# Patient Record
Sex: FEMALE | NOT HISPANIC OR LATINO | Employment: PART TIME
[De-identification: names, ages, dates, MRNs, and addresses within clinical notes are randomized per-mention and may not be internally consistent; named-entity substitution may affect disease eponyms.]

---

## 2017-06-17 ENCOUNTER — HEALTH MAINTENANCE LETTER (OUTPATIENT)
Age: 42
End: 2017-06-17

## 2022-10-11 NOTE — TELEPHONE ENCOUNTER
MEDICAL RECORDS REQUEST   [facility]  Urology Clinic  [address] [place] [address]  PHONE: [phone]  Fax: [phone]        FUTURE VISIT INFORMATION                                                   Meaghan Chavira, : 1975 scheduled for future visit at [facility]    APPOINTMENT INFORMATION:    Date: 10/26/2022    Provider:  Karl Hurt MD    Reason for Visit/Diagnosis: Urethral Diverticulum    RECORDS REQUESTED FOR VISIT                                                     NOTES  STATUS/DETAILS   OFFICE NOTE from referring provider  yes, 10/11/2022 -- [place]gu[place] APRN[place] [facility]ICATION LIST  yes, Care Everywhere     PRE-VISIT CHECKLIST      Record collection complete Yes   Appointment appropriately scheduled           (right time/right provider) Yes   Joint diagnostic appointment coordinated correctly          (ensure right order & amount of time) Yes   MyChart activation If no, please explain pending   Questionnaire complete If no, please explain pending

## 2022-10-14 ENCOUNTER — TRANSCRIBE ORDERS (OUTPATIENT)
Dept: OTHER | Age: 47
End: 2022-10-14

## 2022-10-14 DIAGNOSIS — N36.1 URETHRAL DIVERTICULUM: ICD-10-CM

## 2022-10-14 DIAGNOSIS — N39.46 MIXED INCONTINENCE: Primary | ICD-10-CM

## 2022-10-17 ENCOUNTER — PRE VISIT (OUTPATIENT)
Dept: UROLOGY | Facility: CLINIC | Age: 47
End: 2022-10-17

## 2022-10-26 ENCOUNTER — PRE VISIT (OUTPATIENT)
Dept: UROLOGY | Facility: CLINIC | Age: 47
End: 2022-10-26

## 2022-10-26 ENCOUNTER — VIRTUAL VISIT (OUTPATIENT)
Dept: UROLOGY | Facility: CLINIC | Age: 47
End: 2022-10-26
Payer: COMMERCIAL

## 2022-10-26 DIAGNOSIS — N36.1 URETHRAL DIVERTICULUM: ICD-10-CM

## 2022-10-26 DIAGNOSIS — N39.46 MIXED INCONTINENCE: ICD-10-CM

## 2022-10-26 PROCEDURE — 99207 PR NO CHARGE LOS: CPT | Performed by: UROLOGY

## 2022-10-26 RX ORDER — CYCLOBENZAPRINE HCL 5 MG
5-10 TABLET ORAL
COMMUNITY
Start: 2022-04-11

## 2022-10-26 RX ORDER — LANOLIN ALCOHOL/MO/W.PET/CERES
3 CREAM (GRAM) TOPICAL
COMMUNITY
Start: 2022-08-20

## 2022-10-26 RX ORDER — EPINEPHRINE 0.3 MG/.3ML
0.3 INJECTION SUBCUTANEOUS
COMMUNITY
Start: 2021-07-21

## 2022-10-26 RX ORDER — ALBUTEROL SULFATE 90 UG/1
2 AEROSOL, METERED RESPIRATORY (INHALATION)
COMMUNITY
Start: 2022-01-03

## 2022-10-26 RX ORDER — POLYETHYLENE GLYCOL 3350 17 G/17G
17 POWDER, FOR SOLUTION ORAL
COMMUNITY

## 2022-10-26 RX ORDER — DIPHENHYDRAMINE HCL 25 MG
25 CAPSULE ORAL
COMMUNITY

## 2022-10-26 RX ORDER — ONDANSETRON 4 MG/1
4 TABLET, ORALLY DISINTEGRATING ORAL
COMMUNITY
Start: 2022-04-09

## 2022-10-26 RX ORDER — PRAVASTATIN SODIUM 10 MG
10 TABLET ORAL DAILY
COMMUNITY

## 2022-10-26 RX ORDER — PREGABALIN 25 MG/1
25 CAPSULE ORAL
COMMUNITY
Start: 2022-04-11

## 2022-10-26 RX ORDER — INSULIN DEGLUDEC 200 U/ML
40 INJECTION, SOLUTION SUBCUTANEOUS
COMMUNITY
Start: 2022-06-29

## 2022-10-26 RX ORDER — IPRATROPIUM BROMIDE AND ALBUTEROL SULFATE 2.5; .5 MG/3ML; MG/3ML
3 SOLUTION RESPIRATORY (INHALATION)
COMMUNITY
Start: 2022-08-15

## 2022-10-26 RX ORDER — OMEPRAZOLE 40 MG/1
40 CAPSULE, DELAYED RELEASE ORAL
COMMUNITY
Start: 2021-12-02

## 2022-10-26 RX ORDER — CETIRIZINE HYDROCHLORIDE 10 MG/1
10 TABLET ORAL
COMMUNITY

## 2022-10-26 RX ORDER — ACETAMINOPHEN 500 MG
500-1000 TABLET ORAL
COMMUNITY
Start: 2022-08-10

## 2022-10-26 NOTE — LETTER
10/26/2022       RE: Meaghan Chavira  [address]  [facility] [address]     Dear Colleague,    Thank you for referring your patient, Meaghan Chavira, to the [facility] [place] at [facility]. Please see a copy of my visit note below.    Meaghan is a 47 year old who is being evaluated via a billable video visit.      How would you like to obtain your AVS? MyChart  If the video visit is dropped, the invitation should be resent by: Text to cell phone: [phone]  Will anyone else be joining your video visit? No        Video-Visit Details    Video Start Time: 8:41 AM    Type of service:  Video Visit    Video End Time:8:50 AM    Originating Location (pt. Location): Home        Distant Location (provider location):  Off-site    Platform used for Video Visit: Futubra    We were not able to connect through either AudioName or Sunlot.  Will help pt. Arrange for in person visit at [facility].      Again, thank you for allowing me to participate in the care of your patient.      Sincerely,    Karl Hurt MD

## 2022-10-26 NOTE — PROGRESS NOTES
We were not able to connect through either eTelemetry or CastleOS.  Will help pt. Arrange for in person visit at [facility].

## 2022-10-26 NOTE — PROGRESS NOTES
Meaghan is a 47 year old who is being evaluated via a billable video visit.      How would you like to obtain your AVS? MyChart  If the video visit is dropped, the invitation should be resent by: Text to cell phone: [phone]  Will anyone else be joining your video visit? No        Video-Visit Details    Video Start Time: 8:41 AM    Type of service:  Video Visit    Video End Time:8:50 AM    Originating Location (pt. Location): Home        Distant Location (provider location):  Off-site    [facility]

## 2022-10-26 NOTE — LETTER
Date:October 27, 2022      Provider requested that no letter be sent. Do not send.       [facility]

## 2022-12-04 ENCOUNTER — HEALTH MAINTENANCE LETTER (OUTPATIENT)
Age: 47
End: 2022-12-04

## 2022-12-14 ENCOUNTER — OFFICE VISIT (OUTPATIENT)
Dept: UROLOGY | Facility: CLINIC | Age: 47
End: 2022-12-14
Payer: COMMERCIAL

## 2022-12-14 VITALS
DIASTOLIC BLOOD PRESSURE: 88 MMHG | HEIGHT: 65 IN | SYSTOLIC BLOOD PRESSURE: 120 MMHG | WEIGHT: 150 LBS | OXYGEN SATURATION: 99 % | BODY MASS INDEX: 24.99 KG/M2 | HEART RATE: 93 BPM

## 2022-12-14 DIAGNOSIS — N36.1 URETHRAL DIVERTICULUM: ICD-10-CM

## 2022-12-14 DIAGNOSIS — N39.46 MIXED INCONTINENCE: Primary | ICD-10-CM

## 2022-12-14 DIAGNOSIS — M62.89 HIGH-TONE PELVIC FLOOR DYSFUNCTION: ICD-10-CM

## 2022-12-14 DIAGNOSIS — M79.18 MYALGIA OF PELVIC FLOOR: ICD-10-CM

## 2022-12-14 DIAGNOSIS — R14.0 BLOATING: ICD-10-CM

## 2022-12-14 DIAGNOSIS — F40.240 CLAUSTROPHOBIA: ICD-10-CM

## 2022-12-14 LAB
ALBUMIN UR-MCNC: NEGATIVE MG/DL
APPEARANCE UR: CLEAR
BILIRUB UR QL STRIP: NEGATIVE
COLOR UR AUTO: YELLOW
GLUCOSE UR STRIP-MCNC: 100 MG/DL
HGB UR QL STRIP: NEGATIVE
KETONES UR STRIP-MCNC: NEGATIVE MG/DL
LEUKOCYTE ESTERASE UR QL STRIP: NEGATIVE
NITRATE UR QL: NEGATIVE
PH UR STRIP: 6.5 [PH] (ref 5–7)
RESULT: 17
SP GR UR STRIP: 1.02 (ref 1–1.03)
UROBILINOGEN UR STRIP-ACNC: 0.2 E.U./DL

## 2022-12-14 PROCEDURE — 51798 US URINE CAPACITY MEASURE: CPT | Performed by: UROLOGY

## 2022-12-14 PROCEDURE — 99204 OFFICE O/P NEW MOD 45 MIN: CPT | Mod: 25 | Performed by: UROLOGY

## 2022-12-14 PROCEDURE — 81003 URINALYSIS AUTO W/O SCOPE: CPT | Mod: QW | Performed by: UROLOGY

## 2022-12-14 RX ORDER — ALPRAZOLAM 1 MG
1 TABLET ORAL ONCE
Qty: 1 TABLET | Refills: 0 | Status: SHIPPED | OUTPATIENT
Start: 2022-12-14 | End: 2022-12-14

## 2022-12-14 ASSESSMENT — ENCOUNTER SYMPTOMS
NAUSEA: 1
WEIGHT GAIN: 0
HALLUCINATIONS: 0
DEPRESSION: 0
HEMATURIA: 0
CHILLS: 0
STIFFNESS: 1
BACK PAIN: 0
JAUNDICE: 0
SKIN CHANGES: 0
NIGHT SWEATS: 1
MYALGIAS: 0
INCREASED ENERGY: 0
BLOATING: 1
POLYPHAGIA: 0
POOR WOUND HEALING: 0
BLOOD IN STOOL: 0
MUSCLE CRAMPS: 0
ALTERED TEMPERATURE REGULATION: 0
DIARRHEA: 0
MUSCLE WEAKNESS: 1
RECTAL PAIN: 0
NAIL CHANGES: 0
POLYDIPSIA: 0
JOINT SWELLING: 1
INSOMNIA: 0
NERVOUS/ANXIOUS: 1
FATIGUE: 0
DIFFICULTY URINATING: 1
FLANK PAIN: 1
DYSURIA: 0
HEARTBURN: 0
DECREASED APPETITE: 0
ARTHRALGIAS: 1
WEIGHT LOSS: 0
CONSTIPATION: 0
PANIC: 1
NECK PAIN: 1
FEVER: 0
DECREASED CONCENTRATION: 0
BOWEL INCONTINENCE: 0
ABDOMINAL PAIN: 1
VOMITING: 1

## 2022-12-14 ASSESSMENT — PAIN SCALES - GENERAL: PAINLEVEL: NO PAIN (0)

## 2022-12-14 NOTE — PATIENT INSTRUCTIONS
Please do the MRI and then return to discuss results    Websites with free information:    [employer] patient website: www.voicesforpfd.org    Total Control Program: www.totalcontrolprogram.com    It was a pleasure meeting with you today.  Thank you for allowing me and my team the privilege of caring for you today.  YOU are the reason we are here, and I truly hope we provided you with the excellent service you deserve.  Please let us know if there is anything else we can do for you so that we can be sure you are leaving completely satisfied with your care experience.

## 2022-12-14 NOTE — NURSING NOTE
Chief Complaint   Patient presents with     urethral diverticulum     Pt here today for Urethral Diverticulum.   pvr 17  DANNIELLE ZamarripaT

## 2022-12-14 NOTE — PROGRESS NOTES
December 14, 2022    Referring Provider: Kenna Argueta NP  [facility]  [address] [place] [address] [place] [address]    Primary Care Provider: Mirza Elliott    Assessment & Plan     Mixed incontinence  We discussed the etiologies of stress and urge incontinence and how they differ. We discussed that the options of treating stress incontinence to include observation, weight loss, pelvic floor physical therapy, incontinence pessary, urethral bulking agents, and surgical correction most commonly with midurethral sling or autologous rectus fascial sling. We then discussed that urge incontinence treatments include observation, weight loss, medications most commonly anticholinergics, physical therapy, biofeedback, intravesical botulinum toxin, percutaneous tibial nerve stimulation and sacral neuromodulation.     Will need to address the possible urethral diverticulum first    - MR Pelvis (GYN) wo & w Contrast; Future    Urethral diverticulum  MRI as it has been 5 years since last MRI, she requests a xanax for the MRI    - MEASURE POST-VOID RESIDUAL URINE/BLADDER CAPACITY, US NON-IMAGING (67343)  - UA without Microscopic [[id number]]; Future  - UA without Microscopic [[id number]]  - MR Pelvis (GYN) wo & w Contrast; Future  - ALPRAZolam (XANAX) 1 MG tablet; Take 1 tablet (1 mg) by mouth once for 1 dose Prior to MRI    High-tone pelvic floor dysfunction/Myalgia of pelvic floor  We discussed how her pelvic floor symptoms are related to the physical exam findings and her pelvic floor myofascial dysfunction.  We discussed how the recommended treatment is dedicated pelvic floor therapy.  Will wait to see what the MRI shows first.  Discussed that if we are to fix the diverticulum she would likely need postoperative pelvic floor therapy  - MR Pelvis (GYN) wo & w Contrast; Future  - ALPRAZolam (XANAX) 1 MG tablet; Take 1 tablet (1 mg) by mouth once for 1 dose Prior to MRI    Bloating  MRI will assess adnexa  - MR  Pelvis (GYN) wo & w Contrast; Future    Claustrophobia    - ALPRAZolam (XANAX) 1 MG tablet; Take 1 tablet (1 mg) by mouth once for 1 dose Prior to MRI             Nicotine/Tobacco Cessation:  She reports that she has been smoking cigarettes. She has been smoking an average of .5 packs per day. She has never used smokeless tobacco.    20 minutes were spent on this day of the encounter in reviewing the EMR including reviewing [facility] notes, [facility] CT scan result, direct patient care including ordering MRI and prescription medication, coordination of care and documentation    Delfina Allen MD MPH  (she/her/hers)   of Urology  [facility]      HPI:  Meaghan Chavira is a 47 year old female who presents for evaluation of her pelvic floor symptoms.     Feels lump in the vagina    Mixed incontinence-variable volume    Denies gross hematuria, UTI, vaginal bleeding, vaginal bulge.  On a stool softner for constipation.  History of dyspareunia and postcoital bleeding    Hysterectomy for fibroids, still ovaries in situ    She is referred to us from [facility], Ms Kenna Argueta (note in CareEverywhere from 10/11/2022).  She has had a history of a urethral diverticulum versus vaginal cyst noted first in 2017.  She recently had a CT scan 9/23/22 at [facility] with findings of a cystic structure at the bladder neck    Smoker-not ready to quit    Past Medical History:   Diagnosis Date     Thyroid disease        Past Surgical History:   Procedure Laterality Date     GYN SURGERY       Left elbow and dislocated shoulder       PARATHYROIDECTOMY N/A 8/28/2014    Procedure: PARATHYROIDECTOMY;  Surgeon: Wally Hamlin MD;  Location: [facility] OR   Cholecystectomy    Social History     Socioeconomic History     Marital status: Single     Spouse name: Not on file     Number of children: Not on file     Years of education: Not on file     Highest education level: Not on file   Occupational History     Not on  file   Tobacco Use     Smoking status: Every Day     Packs/day: 0.50     Types: Cigarettes     Smokeless tobacco: Never   Substance and Sexual Activity     Alcohol use: Yes     Comment: occasionally     Drug use: No     Sexual activity: Not on file   Other Topics Concern     Not on file   Social History Narrative     Not on file     Social Determinants of Health     Financial Resource Strain: Not on file   Food Insecurity: Not on file   Transportation Needs: Not on file   Physical Activity: Not on file   Stress: Not on file   Social Connections: Not on file   Intimate Partner Violence: Not on file   Housing Stability: Not on file     Family History   Problem Relation Age of Onset     Asthma Unknown         children     Review of Systems     Constitutional:  Positive for night sweats. Negative for fever, chills, weight loss, weight gain, fatigue, decreased appetite, recent stressors, height loss, post-operative complications, incisional pain, hallucinations, increased energy, hyperactivity and confused.   HENT:  Negative for ear pain, hearing loss, tinnitus, nosebleeds, trouble swallowing, hoarse voice, mouth sores, sore throat, ear discharge, tooth pain, gum tenderness, taste disturbance, smell disturbance, hearing aid, bleeding gums, dry mouth, sinus pain, sinus congestion and neck mass.    Eyes:  Negative for double vision, pain, redness, eye pain, decreased vision, eye watering, eye bulging, eye dryness, flashing lights, spots, floaters, strabismus, tunnel vision, jaundice and eye irritation.   Respiratory:   Negative for cough, hemoptysis, sputum production, shortness of breath, wheezing, sleep disturbances due to breathing, snores loudly, respiratory pain, dyspnea on exertion, cough disturbing sleep and postural dyspnea.    Cardiovascular:  Negative for chest pain, dyspnea on exertion, palpitations, orthopnea, claudication, leg swelling, fingers/toes turn blue, hypertension, hypotension, syncope, history of  heart murmur, chest pain on exertion, chest pain at rest, pacemaker, few scattered varicosities, leg pain, sleep disturbances due to breathing, tachycardia, light-headedness, exercise intolerance and edema.   Gastrointestinal:  Positive for nausea, vomiting, abdominal pain and bloating. Negative for heartburn, diarrhea, constipation, blood in stool, melena, rectal pain, bowel incontinence, jaundice, coffee ground emesis and change in stool.   Genitourinary:  Positive for bladder incontinence, flank pain, difficulty urinating and nocturia. Negative for dysuria, urgency, hematuria, vaginal discharge, genital sores, dyspareunia, decreased libido, voiding less frequently, arousal difficulty, abnormal vaginal bleeding, excessive menstruation, menstrual changes, hot flashes, vaginal dryness and postmenopausal bleeding.   Musculoskeletal:  Positive for joint swelling, arthralgias, stiffness, neck pain, bone pain and muscle weakness. Negative for myalgias, back pain, muscle cramps and fracture.   Skin:  Positive for sensitivity to sunlight. Negative for nail changes, itching, poor wound healing, rash, hair changes, skin changes, acne, warts, poor wound healing, scarring, flaky skin, Raynaud's phenomenon and skin thickening.   Neurological:  Negative for dizziness, tingling, tremors, speech change, seizures, loss of consciousness, weakness, light-headedness, numbness, headaches, disturbances in coordination, extremity numbness, memory loss, difficulty walking and paralysis.   Endo/Heme:  Negative for anemia, swollen glands and bruises/bleeds easily.   Psychiatric/Behavioral:  Positive for mood swings and panic attacks. Negative for depression, hallucinations, memory loss and decreased concentration.    Breast:  Negative for breast discharge, breast mass, breast pain and nipple retraction.   Endocrine:  Negative for altered temperature regulation, polyphagia, polydipsia, unwanted hair growth and change in facial  "hair.      Allergies   Allergen Reactions     Other [No Clinical Screening - See Comments] Swelling and Difficulty breathing     Bee sting, Carries an epi pen     Current Outpatient Medications   Medication     acetaminophen (TYLENOL) 500 MG tablet     albuterol (PROAIR HFA/PROVENTIL HFA/VENTOLIN HFA) 108 (90 Base) MCG/ACT inhaler     aspirin (ASA) 81 MG EC tablet     calcium carbonate (TUMS) 500 MG chewable tablet     cetirizine (ZYRTEC) 10 MG tablet     cyclobenzaprine (FLEXERIL) 5 MG tablet     diphenhydrAMINE (BENADRYL) 25 MG capsule     EPINEPHrine (ANY BX GENERIC EQUIV) 0.3 MG/0.3ML injection 2-pack     insulin aspart (NOVOLOG PEN) 100 UNIT/ML pen     insulin degludec (TRESIBA FLEXTOUCH) 200 UNIT/ML pen     ipratropium - albuterol 0.5 mg/2.5 mg/3 mL (DUONEB) 0.5-2.5 (3) MG/3ML neb solution     melatonin 3 MG tablet     omeprazole (PRILOSEC) 40 MG DR capsule     ondansetron (ZOFRAN ODT) 4 MG ODT tab     ondansetron (ZOFRAN-ODT) 4 MG disintegrating tablet     polyethylene glycol (MIRALAX) 17 GM/Dose powder     pravastatin (PRAVACHOL) 10 MG tablet     pregabalin (LYRICA) 25 MG capsule     sertraline (ZOLOFT) 50 MG tablet     No current facility-administered medications for this visit.   /88   Pulse 93   Ht 1.651 m (5' 5\")   Wt 68 kg (150 lb)   SpO2 99%   BMI 24.96 kg/m    GENERAL: healthy, alert and no distress  EYES: Eyes grossly normal to inspection, conjunctivae and sclerae normal  HENT: normal cephalic/atraumatic.  External ears, nose and mouth without ulcers or lesions.  RESP: no audible wheeze, cough, or visible cyanosis.  No visible retractions or increased work of breathing.  Able to speak fully in complete sentences.  NEURO: Cranial nerves grossly intact, mentation intact and speech normal  PSYCH: mentation appears normal, affect normal/bright, judgement and insight intact, normal speech and appearance well-groomed  ABD: soft, nontender, nondistended, no organomegaly  : Normal external " genitalia.  She has a high tone pelvic floor with diffuse myofascial tenderness of the pelvic floor.  She has a fullness along the urethra suspicious for urethral diverticulum.    Urine dip +glucose otherwise negative    PVR 17 mL by bladder scan      CC  Patient Care Team:  Mirza Elliott MD as PCP - General (Family Practice)  Karl Hurt MD as MD (Urology)  Delfina Allen MD as MD (Urology)  Karl Hurt MD as Assigned Surgical Provider  [facility]

## 2022-12-14 NOTE — LETTER
12/14/2022       RE: Meaghan Chavira  [address]  [facility] [address]     Dear Colleague,    Thank you for referring your patient, Meaghan Chavira, to the [facility] at [facility]. Please see a copy of my visit note below.    December 14, 2022    Referring Provider: Kenna Argueta NP  [place] [id number] [place] [address]    Primary Care Provider: Mirza Elliott    Assessment & Plan     Mixed incontinence  We discussed the etiologies of stress and urge incontinence and how they differ. We discussed that the options of treating stress incontinence to include observation, weight loss, pelvic floor physical therapy, incontinence pessary, urethral bulking agents, and surgical correction most commonly with midurethral sling or autologous rectus fascial sling. We then discussed that urge incontinence treatments include observation, weight loss, medications most commonly anticholinergics, physical therapy, biofeedback, intravesical botulinum toxin, percutaneous tibial nerve stimulation and sacral neuromodulation.     Will need to address the possible urethral diverticulum first    - MR Pelvis (GYN) wo & w Contrast; Future    Urethral diverticulum  MRI as it has been 5 years since last MRI, she requests a xanax for the MRI    - MEASURE POST-VOID RESIDUAL URINE/BLADDER CAPACITY, US NON-IMAGING (20779)  - UA without Microscopic [[id number]]; Future  - UA without Microscopic [[id number]]  - MR Pelvis (GYN) wo & w Contrast; Future  - ALPRAZolam (XANAX) 1 MG tablet; Take 1 tablet (1 mg) by mouth once for 1 dose Prior to MRI    High-tone pelvic floor dysfunction/Myalgia of pelvic floor  We discussed how her pelvic floor symptoms are related to the physical exam findings and her pelvic floor myofascial dysfunction.  We discussed how the recommended treatment is dedicated pelvic floor therapy.  Will wait to see  what the MRI shows first.  Discussed that if we are to fix the diverticulum she would likely need postoperative pelvic floor therapy  - MR Pelvis (GYN) wo & w Contrast; Future  - ALPRAZolam (XANAX) 1 MG tablet; Take 1 tablet (1 mg) by mouth once for 1 dose Prior to MRI    Bloating  MRI will assess adnexa  - MR Pelvis (GYN) wo & w Contrast; Future    Claustrophobia    - ALPRAZolam (XANAX) 1 MG tablet; Take 1 tablet (1 mg) by mouth once for 1 dose Prior to MRI             Nicotine/Tobacco Cessation:  She reports that she has been smoking cigarettes. She has been smoking an average of .5 packs per day. She has never used smokeless tobacco.    20 minutes were spent on this day of the encounter in reviewing the EMR including reviewing [facility] notes, [facility] CT scan result, direct patient care including ordering MRI and prescription medication, coordination of care and documentation    Delfina Allen MD MPH  (she/her/hers)   of Urology  [facility]      HPI:  Meaghan Chavira is a 47 year old female who presents for evaluation of her pelvic floor symptoms.     Feels lump in the vagina    Mixed incontinence-variable volume    Denies gross hematuria, UTI, vaginal bleeding, vaginal bulge.  On a stool softner for constipation.  History of dyspareunia and postcoital bleeding    Hysterectomy for fibroids, still ovaries in situ    She is referred to us from [facility], Ms Kenna Argueta (note in CareEverywhere from 10/11/2022).  She has had a history of a urethral diverticulum versus vaginal cyst noted first in 2017.  She recently had a CT scan 9/23/22 at [facility] with findings of a cystic structure at the bladder neck    Smoker-not ready to quit    Past Medical History:   Diagnosis Date     Thyroid disease        Past Surgical History:   Procedure Laterality Date     GYN SURGERY       Left elbow and dislocated shoulder       PARATHYROIDECTOMY N/A 8/28/2014    Procedure: PARATHYROIDECTOMY;   Surgeon: Wally Hamlin MD;  Location: [facility] OR   Cholecystectomy    Social History     Socioeconomic History     Marital status: Single     Spouse name: Not on file     Number of children: Not on file     Years of education: Not on file     Highest education level: Not on file   Occupational History     Not on file   Tobacco Use     Smoking status: Every Day     Packs/day: 0.50     Types: Cigarettes     Smokeless tobacco: Never   Substance and Sexual Activity     Alcohol use: Yes     Comment: occasionally     Drug use: No     Sexual activity: Not on file   Other Topics Concern     Not on file   Social History Narrative     Not on file     Social Determinants of Health     Financial Resource Strain: Not on file   Food Insecurity: Not on file   Transportation Needs: Not on file   Physical Activity: Not on file   Stress: Not on file   Social Connections: Not on file   Intimate Partner Violence: Not on file   Housing Stability: Not on file     Family History   Problem Relation Age of Onset     Asthma Unknown         children     Review of Systems     Constitutional:  Positive for night sweats. Negative for fever, chills, weight loss, weight gain, fatigue, decreased appetite, recent stressors, height loss, post-operative complications, incisional pain, hallucinations, increased energy, hyperactivity and confused.   HENT:  Negative for ear pain, hearing loss, tinnitus, nosebleeds, trouble swallowing, hoarse voice, mouth sores, sore throat, ear discharge, tooth pain, gum tenderness, taste disturbance, smell disturbance, hearing aid, bleeding gums, dry mouth, sinus pain, sinus congestion and neck mass.    Eyes:  Negative for double vision, pain, redness, eye pain, decreased vision, eye watering, eye bulging, eye dryness, flashing lights, spots, floaters, strabismus, tunnel vision, jaundice and eye irritation.   Respiratory:   Negative for cough, hemoptysis, sputum production, shortness of breath, wheezing, sleep  disturbances due to breathing, snores loudly, respiratory pain, dyspnea on exertion, cough disturbing sleep and postural dyspnea.    Cardiovascular:  Negative for chest pain, dyspnea on exertion, palpitations, orthopnea, claudication, leg swelling, fingers/toes turn blue, hypertension, hypotension, syncope, history of heart murmur, chest pain on exertion, chest pain at rest, pacemaker, few scattered varicosities, leg pain, sleep disturbances due to breathing, tachycardia, light-headedness, exercise intolerance and edema.   Gastrointestinal:  Positive for nausea, vomiting, abdominal pain and bloating. Negative for heartburn, diarrhea, constipation, blood in stool, melena, rectal pain, bowel incontinence, jaundice, coffee ground emesis and change in stool.   Genitourinary:  Positive for bladder incontinence, flank pain, difficulty urinating and nocturia. Negative for dysuria, urgency, hematuria, vaginal discharge, genital sores, dyspareunia, decreased libido, voiding less frequently, arousal difficulty, abnormal vaginal bleeding, excessive menstruation, menstrual changes, hot flashes, vaginal dryness and postmenopausal bleeding.   Musculoskeletal:  Positive for joint swelling, arthralgias, stiffness, neck pain, bone pain and muscle weakness. Negative for myalgias, back pain, muscle cramps and fracture.   Skin:  Positive for sensitivity to sunlight. Negative for nail changes, itching, poor wound healing, rash, hair changes, skin changes, acne, warts, poor wound healing, scarring, flaky skin, Raynaud's phenomenon and skin thickening.   Neurological:  Negative for dizziness, tingling, tremors, speech change, seizures, loss of consciousness, weakness, light-headedness, numbness, headaches, disturbances in coordination, extremity numbness, memory loss, difficulty walking and paralysis.   Endo/Heme:  Negative for anemia, swollen glands and bruises/bleeds easily.   Psychiatric/Behavioral:  Positive for mood swings and panic  "attacks. Negative for depression, hallucinations, memory loss and decreased concentration.    Breast:  Negative for breast discharge, breast mass, breast pain and nipple retraction.   Endocrine:  Negative for altered temperature regulation, polyphagia, polydipsia, unwanted hair growth and change in facial hair.      Allergies   Allergen Reactions     Other [No Clinical Screening - See Comments] Swelling and Difficulty breathing     Bee sting, Carries an epi pen     Current Outpatient Medications   Medication     acetaminophen (TYLENOL) 500 MG tablet     albuterol (PROAIR HFA/PROVENTIL HFA/VENTOLIN HFA) 108 (90 Base) MCG/ACT inhaler     aspirin (ASA) 81 MG EC tablet     calcium carbonate (TUMS) 500 MG chewable tablet     cetirizine (ZYRTEC) 10 MG tablet     cyclobenzaprine (FLEXERIL) 5 MG tablet     diphenhydrAMINE (BENADRYL) 25 MG capsule     EPINEPHrine (ANY BX GENERIC EQUIV) 0.3 MG/0.3ML injection 2-pack     insulin aspart (NOVOLOG PEN) 100 UNIT/ML pen     insulin degludec (TRESIBA FLEXTOUCH) 200 UNIT/ML pen     ipratropium - albuterol 0.5 mg/2.5 mg/3 mL (DUONEB) 0.5-2.5 (3) MG/3ML neb solution     melatonin 3 MG tablet     omeprazole (PRILOSEC) 40 MG DR capsule     ondansetron (ZOFRAN ODT) 4 MG ODT tab     ondansetron (ZOFRAN-ODT) 4 MG disintegrating tablet     polyethylene glycol (MIRALAX) 17 GM/Dose powder     pravastatin (PRAVACHOL) 10 MG tablet     pregabalin (LYRICA) 25 MG capsule     sertraline (ZOLOFT) 50 MG tablet     No current facility-administered medications for this visit.   /88   Pulse 93   Ht 1.651 m (5' 5\")   Wt 68 kg (150 lb)   SpO2 99%   BMI 24.96 kg/m    GENERAL: healthy, alert and no distress  EYES: Eyes grossly normal to inspection, conjunctivae and sclerae normal  HENT: normal cephalic/atraumatic.  External ears, nose and mouth without ulcers or lesions.  RESP: no audible wheeze, cough, or visible cyanosis.  No visible retractions or increased work of breathing.  Able to speak " fully in complete sentences.  NEURO: Cranial nerves grossly intact, mentation intact and speech normal  PSYCH: mentation appears normal, affect normal/bright, judgement and insight intact, normal speech and appearance well-groomed  ABD: soft, nontender, nondistended, no organomegaly  : Normal external genitalia.  She has a high tone pelvic floor with diffuse myofascial tenderness of the pelvic floor.  She has a fullness along the urethra suspicious for urethral diverticulum.    Urine dip +glucose otherwise negative    PVR 17 mL by bladder scan      CC  Patient Care Team:  Mirza Elliott MD as PCP - General (Family Practice)  Karl Hurt MD as MD (Urology)  Delfina Allen MD as MD (Urology)  Karl Hurt MD as Assigned Surgical Provider  [facility]      Sincerely,  Delfina Allen MD

## 2022-12-19 ASSESSMENT — ENCOUNTER SYMPTOMS
HALLUCINATIONS: 0
SPUTUM PRODUCTION: 0
EYE IRRITATION: 0
WEIGHT LOSS: 0
ORTHOPNEA: 0
JOINT SWELLING: 1
HEMATURIA: 0
TINGLING: 0
BRUISES/BLEEDS EASILY: 0
LOSS OF CONSCIOUSNESS: 0
RECTAL PAIN: 0
SORE THROAT: 0
MEMORY LOSS: 0
DIARRHEA: 0
HOARSE VOICE: 0
EYE PAIN: 0
EXERCISE INTOLERANCE: 0
POSTURAL DYSPNEA: 0
EYE WATERING: 0
TROUBLE SWALLOWING: 0
JAUNDICE: 0
BREAST PAIN: 0
FATIGUE: 0
NERVOUS/ANXIOUS: 1
BLOATING: 1
DYSPNEA ON EXERTION: 0
TACHYCARDIA: 0
MYALGIAS: 0
WEIGHT GAIN: 0
DIFFICULTY URINATING: 1
HEADACHES: 0
MUSCLE CRAMPS: 0
SWOLLEN GLANDS: 0
SKIN CHANGES: 0
SINUS CONGESTION: 0
BLOOD IN STOOL: 0
EYE REDNESS: 0
NAUSEA: 1
DECREASED LIBIDO: 0
DEPRESSION: 0
POLYPHAGIA: 0
TASTE DISTURBANCE: 0
WHEEZING: 0
TREMORS: 0
COUGH DISTURBING SLEEP: 0
DISTURBANCES IN COORDINATION: 0
NIGHT SWEATS: 1
POOR WOUND HEALING: 0
INCREASED ENERGY: 0
DIZZINESS: 0
SINUS PAIN: 0
SLEEP DISTURBANCES DUE TO BREATHING: 0
PALPITATIONS: 0
CLAUDICATION: 0
PANIC: 1
DECREASED CONCENTRATION: 0
NAIL CHANGES: 0
HYPOTENSION: 0
VOMITING: 1
WEAKNESS: 0
SPEECH CHANGE: 0
SYNCOPE: 0
SHORTNESS OF BREATH: 0
NECK MASS: 0
POLYDIPSIA: 0
FEVER: 0
INSOMNIA: 0
SEIZURES: 0
PARALYSIS: 0
HEARTBURN: 0
DECREASED APPETITE: 0
CHILLS: 0
SMELL DISTURBANCE: 0
LEG SWELLING: 0
DYSURIA: 0
ABDOMINAL PAIN: 1
NECK PAIN: 1
HYPERTENSION: 0
FLANK PAIN: 1
SNORES LOUDLY: 0
CONSTIPATION: 0
ALTERED TEMPERATURE REGULATION: 0
NUMBNESS: 0
LEG PAIN: 0
BREAST MASS: 0
ARTHRALGIAS: 1
COUGH: 0
LIGHT-HEADEDNESS: 0
BACK PAIN: 0
EXTREMITY NUMBNESS: 0
HOT FLASHES: 0
DOUBLE VISION: 0
STIFFNESS: 1
HEMOPTYSIS: 0
BOWEL INCONTINENCE: 0
MUSCLE WEAKNESS: 1
RESPIRATORY PAIN: 0

## 2022-12-26 ENCOUNTER — PRE VISIT (OUTPATIENT)
Dept: UROLOGY | Facility: CLINIC | Age: 47
End: 2022-12-26

## 2022-12-26 NOTE — TELEPHONE ENCOUNTER
Reason for visit: Review MRI     Relevant information: Urethral diverticulum    Records/imaging/labs/orders: all appointments scheduled    Pt called: No need for a call    At Rooming: standard    Angela Berumen CMA  12/26/2022  8:34 AM

## 2022-12-29 ENCOUNTER — ANCILLARY PROCEDURE (OUTPATIENT)
Dept: MRI IMAGING | Facility: CLINIC | Age: 47
End: 2022-12-29
Attending: UROLOGY
Payer: COMMERCIAL

## 2022-12-29 DIAGNOSIS — R14.0 BLOATING: ICD-10-CM

## 2022-12-29 DIAGNOSIS — M79.18 MYALGIA OF PELVIC FLOOR: ICD-10-CM

## 2022-12-29 DIAGNOSIS — N36.1 URETHRAL DIVERTICULUM: ICD-10-CM

## 2022-12-29 DIAGNOSIS — N39.46 MIXED INCONTINENCE: ICD-10-CM

## 2022-12-29 DIAGNOSIS — M62.89 HIGH-TONE PELVIC FLOOR DYSFUNCTION: ICD-10-CM

## 2022-12-29 PROCEDURE — 72197 MRI PELVIS W/O & W/DYE: CPT

## 2022-12-29 PROCEDURE — 255N000002 HC RX 255 OP 636: Performed by: UROLOGY

## 2022-12-29 PROCEDURE — A9585 GADOBUTROL INJECTION: HCPCS | Performed by: UROLOGY

## 2022-12-29 RX ORDER — GADOBUTROL 604.72 MG/ML
7.5 INJECTION INTRAVENOUS ONCE
Status: COMPLETED | OUTPATIENT
Start: 2022-12-29 | End: 2022-12-29

## 2022-12-29 RX ADMIN — GADOBUTROL 7.5 ML: 604.72 INJECTION INTRAVENOUS at 09:46

## 2023-01-05 ENCOUNTER — OFFICE VISIT (OUTPATIENT)
Dept: UROLOGY | Facility: CLINIC | Age: 48
End: 2023-01-05
Payer: COMMERCIAL

## 2023-01-05 VITALS
BODY MASS INDEX: 24.99 KG/M2 | SYSTOLIC BLOOD PRESSURE: 143 MMHG | DIASTOLIC BLOOD PRESSURE: 97 MMHG | HEART RATE: 88 BPM | WEIGHT: 150 LBS | HEIGHT: 65 IN

## 2023-01-05 DIAGNOSIS — N36.1 URETHRAL DIVERTICULUM: Primary | ICD-10-CM

## 2023-01-05 DIAGNOSIS — M79.18 MYALGIA OF PELVIC FLOOR: ICD-10-CM

## 2023-01-05 DIAGNOSIS — M62.89 HIGH-TONE PELVIC FLOOR DYSFUNCTION: ICD-10-CM

## 2023-01-05 DIAGNOSIS — N39.46 MIXED INCONTINENCE: ICD-10-CM

## 2023-01-05 PROCEDURE — 99214 OFFICE O/P EST MOD 30 MIN: CPT | Performed by: UROLOGY

## 2023-01-05 RX ORDER — CEFAZOLIN SODIUM 2 G/50ML
2 SOLUTION INTRAVENOUS
Status: CANCELLED | OUTPATIENT
Start: 2023-01-05

## 2023-01-05 RX ORDER — CEFAZOLIN SODIUM 2 G/50ML
2 SOLUTION INTRAVENOUS SEE ADMIN INSTRUCTIONS
Status: CANCELLED | OUTPATIENT
Start: 2023-01-05

## 2023-01-05 NOTE — LETTER
1/5/2023       RE: Meaghan Chavira  [address]  [facility] [address]     Dear Colleague,    Thank you for referring your patient, Meaghan Chavria, to the [facility] UROLOGY CLINIC [place] at [facility]. Please see a copy of my visit note below.    January 5, 2023    Meaghan was seen today for recheck.    Diagnoses and all orders for this visit:    Urethral diverticulum  -     Case Request: CYSTOSCOPY, WITH OPEN EXCISION OF URETHRAL DIVERTICULUM; Standing  -     Case Request: CYSTOSCOPY, WITH OPEN EXCISION OF URETHRAL DIVERTICULUM    Mixed incontinence  -     Case Request: CYSTOSCOPY, WITH OPEN EXCISION OF URETHRAL DIVERTICULUM; Standing  -     Case Request: CYSTOSCOPY, WITH OPEN EXCISION OF URETHRAL DIVERTICULUM    Myalgia of pelvic floor  -     Case Request: CYSTOSCOPY, WITH OPEN EXCISION OF URETHRAL DIVERTICULUM; Standing  -     Case Request: CYSTOSCOPY, WITH OPEN EXCISION OF URETHRAL DIVERTICULUM    High-tone pelvic floor dysfunction  -     Case Request: CYSTOSCOPY, WITH OPEN EXCISION OF URETHRAL DIVERTICULUM; Standing  -     Case Request: CYSTOSCOPY, WITH OPEN EXCISION OF URETHRAL DIVERTICULUM    Other orders  -     Void on call to OR; Standing  -     Forced Air Warming Device; Standing  -     Cleanse skin for procedure; Standing  -     Notify Provider - Anticoagulants and Antiplatelets; Standing  -     Glucose monitor nursing POCT; Standing  -     NPO per Anesthesia Guidelines for Procedure/Surgery Except for: Meds; Standing  -     Apply Pneumatic Compression Device (PCD); Standing  -     Pneumatic Compression Device (PCD) (Equipment); Standing  -     ceFAZolin (ANCEF) intermittent infusion 2 g in 50 mL dextrose PREMIX  -     ceFAZolin (ANCEF) intermittent infusion 2 g in 50 mL dextrose PREMIX      Discussed that the treated of the urethral diverticulum is surgical.  Discussed that given her pelvic floor dysfunction however that the surgery may  "not help any pain that she is having and that in this setting I would stage any stress incontinence procedures.  Discussed the high likelihood of her having to go to pelvic floor physical therapy after the surgery as well.      We discussed that the risks to the procedure include but not limited to cardiovascular risks of anesthesia, nerve injury, bleeding, infection, persistent or worsening stress incontinence or urge incontinence, need for post-operative parra catheter, need for further procedures including for recurrence, stricture, and fistula.  Patient expressed understanding and agreeable to proceed.     10 minutes were spent today on the day of the encounter in reviewing the EMR including interpretation of the MRI, direct patient care including surgical counseling, coordination of care and documentation    Delfina Allen MD MPH  (she/her/hers)   of Urology  [facility]      Subjective    She is here today for follow up after her MRI. She denies any changes in health since last visit    BP (!) 143/97   Pulse 88   Ht 1.651 m (5' 5\")   Wt 68 kg (150 lb)   BMI 24.96 kg/m    GENERAL: healthy, alert and no distress  EYES: Eyes grossly normal to inspection, conjunctivae and sclerae normal  HENT: normal cephalic/atraumatic.  External ears, nose and mouth without ulcers or lesions.  RESP: no audible wheeze, cough, or visible cyanosis.  No visible retractions or increased work of breathing.  Able to speak fully in complete sentences.  NEURO: Cranial nerves grossly intact, mentation intact and speech normal  PSYCH: mentation appears normal, affect normal/bright, judgement and insight intact, normal speech and appearance well-groomed    MRI images reviewed. On my interpretation there is a small urethral diverticulum posterior on the right side    CC  Patient Care Team:  Mirza Elliott MD as PCP - General (Family Practice)  Karl Hurt MD as MD (Urology)  Delfina Allen See " MD Casey as MD (Urology)

## 2023-01-05 NOTE — NURSING NOTE
"Chief Complaint   Patient presents with     RECHECK     Review MRI       Blood pressure (!) 143/97, pulse 88, height 1.651 m (5' 5\"), weight 68 kg (150 lb). Body mass index is 24.96 kg/m .    Patient Active Problem List   Diagnosis     Parathyroid adenoma     Nontoxic multinodular goiter     Primary hyperparathyroidism (H)     Mixed incontinence     Urethral diverticulum     High-tone pelvic floor dysfunction     Myalgia of pelvic floor     Bloating       Allergies   Allergen Reactions     Other [No Clinical Screening - See Comments] Swelling and Difficulty breathing     Bee sting, Carries an epi pen       Current Outpatient Medications   Medication Sig Dispense Refill     acetaminophen (TYLENOL) 500 MG tablet Take 500-1,000 mg by mouth       albuterol (PROAIR HFA/PROVENTIL HFA/VENTOLIN HFA) 108 (90 Base) MCG/ACT inhaler Inhale 2 puffs into the lungs       aspirin (ASA) 81 MG EC tablet Take 81 mg by mouth       calcium carbonate (TUMS) 500 MG chewable tablet Take 2 tablets (1,000 mg) by mouth 2 times daily 90 chew tab 1     cetirizine (ZYRTEC) 10 MG tablet Take 10 mg by mouth       cyclobenzaprine (FLEXERIL) 5 MG tablet Take 5-10 mg by mouth       diphenhydrAMINE (BENADRYL) 25 MG capsule Take 25 mg by mouth       EPINEPHrine (ANY BX GENERIC EQUIV) 0.3 MG/0.3ML injection 2-pack Inject 0.3 mg into the muscle       insulin aspart (NOVOLOG PEN) 100 UNIT/ML pen Glucose 130-150 0  Glucose 151-200 1 unit  Glucose 201-250 2 units  Glucose 251-300 3 units  Glucose 301-350 4 units  Glucose 351-400 5 units  Glucose 401-500 6 units  Glucose GREATER THAN 501 7 units       insulin degludec (TRESIBA FLEXTOUCH) 200 UNIT/ML pen Inject 40 Units Subcutaneous       ipratropium - albuterol 0.5 mg/2.5 mg/3 mL (DUONEB) 0.5-2.5 (3) MG/3ML neb solution 3 mLs       melatonin 3 MG tablet Take 3 mg by mouth       omeprazole (PRILOSEC) 40 MG DR capsule Take 40 mg by mouth       ondansetron (ZOFRAN ODT) 4 MG ODT tab Take 4 mg by mouth       " ondansetron (ZOFRAN-ODT) 4 MG disintegrating tablet Take 1 tablet (4 mg) by mouth every 6 hours as needed for nausea 20 tablet 1     polyethylene glycol (MIRALAX) 17 GM/Dose powder Take 17 g by mouth       pravastatin (PRAVACHOL) 10 MG tablet Take 10 mg by mouth daily       pregabalin (LYRICA) 25 MG capsule Take 25 mg by mouth       sertraline (ZOLOFT) 50 MG tablet Take 50 mg by mouth daily         Social History     Tobacco Use     Smoking status: Every Day     Packs/day: 0.50     Types: Cigarettes     Smokeless tobacco: Never   Substance Use Topics     Alcohol use: Yes     Comment: occasionally     Drug use: No       Angela Berumen CMA  1/5/2023  4:02 PM

## 2023-01-16 ENCOUNTER — TELEPHONE (OUTPATIENT)
Dept: UROLOGY | Facility: CLINIC | Age: 48
End: 2023-01-16

## 2023-01-16 NOTE — TELEPHONE ENCOUNTER
Called to schedule procedure with Dr. Allen per patient, she should not schedule procedure until her A1c was down.     Esther Greene on 1/16/2023 at 10:51 AM

## 2023-01-20 ENCOUNTER — TELEPHONE (OUTPATIENT)
Dept: UROLOGY | Facility: CLINIC | Age: 48
End: 2023-01-20
Payer: COMMERCIAL

## 2023-01-20 NOTE — TELEPHONE ENCOUNTER
Patient reports her last A1C was 10.7  Done 1/12/23  Patient is working with her primary and her pharmacist  Marcy Mendez, RN, BSN  Care Coordinator Urology

## 2023-01-20 NOTE — TELEPHONE ENCOUNTER
----- Message from Delfina See Casey Allen MD sent at 1/16/2023 11:17 AM CST -----  Correct    ----- Message -----  From: Esther Greene  Sent: 1/16/2023  10:52 AM CST  To: Delfina See Casey Allen MD, Marcy Mendez, ALBERTA    She said not to schedule until her A1c is down?     Esther

## 2023-06-01 ENCOUNTER — HEALTH MAINTENANCE LETTER (OUTPATIENT)
Age: 48
End: 2023-06-01

## 2023-06-02 ENCOUNTER — TELEPHONE (OUTPATIENT)
Dept: UROLOGY | Facility: CLINIC | Age: 48
End: 2023-06-02
Payer: COMMERCIAL

## 2023-06-02 NOTE — TELEPHONE ENCOUNTER
Patient had another A1C done 5/24/23 with her primary which revealed 10.2 A1C  Will contact the patient in the fall to see if surgery is possible with possible decrease in her A1C    Updated Dr Tiffany Mendez, RN, BSN  Care Coordinator Urology  [facility], [employer]  Urology Clinic  [phone]

## 2023-06-02 NOTE — TELEPHONE ENCOUNTER
----- Message from Marcy Mendez RN sent at 5/2/2023  1:45 PM CDT -----    ----- Message -----  From: Marcy Mendez RN  Sent: 5/2/2023  12:00 AM CDT  To: Marcy Mendez RN      ----- Message -----  From: Marcy Mendez RN  Sent: 4/3/2023  12:00 AM CDT  To: Marcy Mendez RN    Call to check with the patient to see what her A1C is   Care everywhere  Marcy   ----- Message -----  From: Delfina Allen MD  Sent: 1/16/2023  11:17 AM CST  To: Marcy Torres RN    Correct    ----- Message -----  From: Esther Greene  Sent: 1/16/2023  10:52 AM CST  To: Delfina Allen MD, Marcy Mendez RN    She said not to schedule until her A1c is down?     Esther

## 2023-12-15 ENCOUNTER — TRANSCRIBE ORDERS (OUTPATIENT)
Dept: OTHER | Age: 48
End: 2023-12-15

## 2023-12-15 DIAGNOSIS — N39.3 STRESS INCONTINENCE: ICD-10-CM

## 2023-12-15 DIAGNOSIS — N39.3 FEMALE STRESS INCONTINENCE: Primary | ICD-10-CM

## 2023-12-15 DIAGNOSIS — N36.1 URETHRAL DIVERTICULUM: ICD-10-CM

## 2024-01-13 ENCOUNTER — HEALTH MAINTENANCE LETTER (OUTPATIENT)
Age: 49
End: 2024-01-13

## 2024-08-10 ENCOUNTER — HEALTH MAINTENANCE LETTER (OUTPATIENT)
Age: 49
End: 2024-08-10

## 2025-07-23 ENCOUNTER — MEDICAL CORRESPONDENCE (OUTPATIENT)
Dept: HEALTH INFORMATION MANAGEMENT | Facility: CLINIC | Age: 50
End: 2025-07-23
Payer: COMMERCIAL

## 2025-07-24 ENCOUNTER — TRANSCRIBE ORDERS (OUTPATIENT)
Dept: OTHER | Age: 50
End: 2025-07-24

## 2025-07-24 DIAGNOSIS — R40.20 LOSS OF CONSCIOUSNESS (H): Primary | ICD-10-CM

## 2025-07-28 ENCOUNTER — PATIENT OUTREACH (OUTPATIENT)
Dept: CARE COORDINATION | Facility: CLINIC | Age: 50
End: 2025-07-28
Payer: COMMERCIAL

## 2025-08-04 ENCOUNTER — TRANSCRIBE ORDERS (OUTPATIENT)
Dept: NEUROLOGY | Facility: CLINIC | Age: 50
End: 2025-08-04

## 2025-08-04 DIAGNOSIS — R40.20 LOSS OF CONSCIOUSNESS (H): Primary | ICD-10-CM

## 2025-08-05 ENCOUNTER — TELEPHONE (OUTPATIENT)
Dept: NEUROLOGY | Facility: CLINIC | Age: 50
End: 2025-08-05

## 2025-08-05 ENCOUNTER — PATIENT OUTREACH (OUTPATIENT)
Dept: CARE COORDINATION | Facility: CLINIC | Age: 50
End: 2025-08-05
Payer: COMMERCIAL

## 2025-08-16 ENCOUNTER — HEALTH MAINTENANCE LETTER (OUTPATIENT)
Age: 50
End: 2025-08-16